# Patient Record
(demographics unavailable — no encounter records)

---

## 2025-07-12 NOTE — HISTORY OF PRESENT ILLNESS
[de-identified] : 69 y/o M referred by his oral maxillofacial surgeon for eval of possible sinusitis found on imaging. Patient recently underwent dental work (tooth extraction d/t fracture of tooth) and was told that his CT dental scan from x2 weeks ago revealed sinus disease. He brought a USB with the dentist's office CT images in office. Denies headaches, rhinorrhea, discolored nasal drainage, h/o seasonal allergies, anosmia, nasal congestion or facial discomfort over his sinuses.

## 2025-07-12 NOTE — ASSESSMENT
[FreeTextEntry1] : 1. he was told he has sinusitis -asymptomatic and normal exam -unable to view CT dental in office - attempted, but explained these scans are often concentrated on teeth and fom and do not get comprehensive sinus views -CT sinus ordered -RTC with ct; asked to call in the interim if sx develop

## 2025-07-12 NOTE — HISTORY OF PRESENT ILLNESS
[de-identified] : 69 y/o M referred by his oral maxillofacial surgeon for eval of possible sinusitis found on imaging. Patient recently underwent dental work (tooth extraction d/t fracture of tooth) and was told that his CT dental scan from x2 weeks ago revealed sinus disease. He brought a USB with the dentist's office CT images in office. Denies headaches, rhinorrhea, discolored nasal drainage, h/o seasonal allergies, anosmia, nasal congestion or facial discomfort over his sinuses.

## 2025-07-24 NOTE — ASSESSMENT
[FreeTextEntry1] : asymptomatic - sinus findings on CT -post surgical change prior sinus surgery possibly healed over -appears allergic -will observe for now -instructed he return if sx of sinus pressure, purulent drainage, nasal congestion develop  allergic rhinitis -flonase 2 sprays QD for 3 weeks, continue if feels better  RTC prn any issues

## 2025-07-24 NOTE — DATA REVIEWED
[de-identified] : CT sinus noting bilateral patchy disease in maxillary, ethmoids and mildly in sphenoid post surgical changes of septum, polypoid mucosal changes images and report reviewed with patient

## 2025-07-24 NOTE — PROCEDURE
[Posterior Lesion] : posterior lesion [Anterior rhinoscopy insufficient to account for symptoms] : anterior rhinoscopy insufficient to account for symptoms [Topical Lidocaine] : topical lidocaine [Oxymetazoline HCl] : oxymetazoline HCl [Flexible Endoscope] : examined with the flexible endoscope [Normal] : the nasopharynx was normal [Serial Number: ___] : Serial Number: [unfilled] [de-identified] : done to ro sinus dz as there is sinus opacification on ct - could not see entire nose with speculum normal [FreeTextEntry6] : Mucosa: b nl Mucus:b nl Polyps:b nl Inferior turbinate:b nl Middle turbinate:b nl Superior turbinate:b nl Inferior Meatus:b nl Middle Meatus:b nl Superior meatus:b nl Sphenoethmoidal recess:b nl

## 2025-07-24 NOTE — HISTORY OF PRESENT ILLNESS
[de-identified] : 2 week follow up visit for this 69 y/o M referred by his oral maxillofacial surgeon s/p dental extraction for fx'ed tooth for eval of possible sinusitis found on imaging. Presented asymptomatic and without exam findings. Patient completed CT sinus and presents to review those results. He has h/o sinonasal surgery many years ago, for nasal obstruction.  Denies headaches, rhinorrhea, discolored nasal drainage, h/o seasonal allergies, anosmia, nasal congestion or facial discomfort over his sinuses.